# Patient Record
Sex: MALE | Race: WHITE | NOT HISPANIC OR LATINO | URBAN - NONMETROPOLITAN AREA
[De-identification: names, ages, dates, MRNs, and addresses within clinical notes are randomized per-mention and may not be internally consistent; named-entity substitution may affect disease eponyms.]

---

## 2017-12-05 PROBLEM — H52.03: Noted: 2017-12-05

## 2017-12-05 PROBLEM — H25.813: Noted: 2017-12-05

## 2017-12-05 PROBLEM — H52.4: Noted: 2017-12-05

## 2017-12-05 PROBLEM — H52.223: Noted: 2017-12-05

## 2019-01-10 ENCOUNTER — IMPORTED ENCOUNTER (OUTPATIENT)
Dept: URBAN - NONMETROPOLITAN AREA CLINIC 1 | Facility: CLINIC | Age: 59
End: 2019-01-10

## 2019-01-10 PROCEDURE — 92014 COMPRE OPH EXAM EST PT 1/>: CPT

## 2019-01-10 PROCEDURE — 92015 DETERMINE REFRACTIVE STATE: CPT

## 2019-01-10 NOTE — PATIENT DISCUSSION
"Hyperopia-Discussed diagnosis with patient. Astigmatism-Discussed diagnosis with patient. Presbyopia-Discussed diagnosis with patient. Updated spec Rx given. ""Cataract OU-Not yet surgical. -Reviewed symptoms of advancing cataract growth such as glare and halos and decreased vision.-Continue to monitor for now. Pt will notify us if any new symptoms develop. "

## 2020-01-17 ENCOUNTER — IMPORTED ENCOUNTER (OUTPATIENT)
Dept: URBAN - NONMETROPOLITAN AREA CLINIC 1 | Facility: CLINIC | Age: 60
End: 2020-01-17

## 2020-01-17 PROCEDURE — 92015 DETERMINE REFRACTIVE STATE: CPT

## 2020-01-17 PROCEDURE — 92014 COMPRE OPH EXAM EST PT 1/>: CPT

## 2021-01-22 ENCOUNTER — PREPPED CHART (OUTPATIENT)
Dept: RURAL CLINIC 1 | Facility: CLINIC | Age: 61
End: 2021-01-22

## 2021-01-22 ENCOUNTER — IMPORTED ENCOUNTER (OUTPATIENT)
Dept: URBAN - NONMETROPOLITAN AREA CLINIC 1 | Facility: CLINIC | Age: 61
End: 2021-01-22

## 2021-01-22 PROCEDURE — 92015 DETERMINE REFRACTIVE STATE: CPT

## 2021-01-22 PROCEDURE — 92014 COMPRE OPH EXAM EST PT 1/>: CPT

## 2021-01-22 NOTE — PATIENT DISCUSSION
"Hyperopia-Discussed diagnosis with patient. Astigmatism-Discussed diagnosis with patient. Presbyopia-Discussed diagnosis with patient. ""Updated spec Rx given. Recommend lens that will provide comfort as well as protect safety and health of eyes. Cataract OU-Not yet surgical. -Reviewed symptoms of advancing cataract growth such as glare and halos and decreased vision.-Continue to monitor for now. Pt will notify us if any new symptoms develop. "

## 2022-01-18 ASSESSMENT — TONOMETRY
OD_IOP_MMHG: 15
OS_IOP_MMHG: 15

## 2022-01-18 ASSESSMENT — VISUAL ACUITY
OD_CC: 20/20
OS_CC: 20/25

## 2022-01-24 ENCOUNTER — COMPREHENSIVE EXAM (OUTPATIENT)
Dept: RURAL CLINIC 1 | Facility: CLINIC | Age: 62
End: 2022-01-24

## 2022-01-24 DIAGNOSIS — H52.03: ICD-10-CM

## 2022-01-24 DIAGNOSIS — H52.223: ICD-10-CM

## 2022-01-24 DIAGNOSIS — H52.4: ICD-10-CM

## 2022-01-24 DIAGNOSIS — H25.813: ICD-10-CM

## 2022-01-24 PROCEDURE — 92014 COMPRE OPH EXAM EST PT 1/>: CPT

## 2022-01-24 PROCEDURE — 92015 DETERMINE REFRACTIVE STATE: CPT

## 2022-01-24 ASSESSMENT — VISUAL ACUITY
OS_SC: 20/25
OD_SC: 20/40
OU_SC: 20/25

## 2022-04-09 ASSESSMENT — TONOMETRY
OD_IOP_MMHG: 15
OD_IOP_MMHG: 15
OS_IOP_MMHG: 15
OS_IOP_MMHG: 15
OD_IOP_MMHG: 15
OS_IOP_MMHG: 15

## 2022-04-09 ASSESSMENT — VISUAL ACUITY
OD_GLARE: 20/40
OU_CC: J1+
OS_SC: 20/25
OD_SC: 20/20
OU_CC: J1+
OS_SC: 20/50-1
OD_SC: 20/20
OD_SC: 20/30
OU_CC: J2
OS_SC: 20/20
OS_GLARE: 20/40

## 2023-04-12 ENCOUNTER — COMPREHENSIVE EXAM (OUTPATIENT)
Dept: RURAL CLINIC 1 | Facility: CLINIC | Age: 63
End: 2023-04-12

## 2023-04-12 DIAGNOSIS — H52.03: ICD-10-CM

## 2023-04-12 DIAGNOSIS — H52.223: ICD-10-CM

## 2023-04-12 DIAGNOSIS — H25.813: ICD-10-CM

## 2023-04-12 DIAGNOSIS — H52.4: ICD-10-CM

## 2023-04-12 PROCEDURE — 92014 COMPRE OPH EXAM EST PT 1/>: CPT

## 2023-04-12 PROCEDURE — 92015 DETERMINE REFRACTIVE STATE: CPT

## 2023-04-12 ASSESSMENT — VISUAL ACUITY
OS_CC: 20/20-1
OS_CC: 20/20
OD_CC: 20/20
OD_CC: 20/20
OU_CC: 20/20
OU_CC: 20/20

## 2023-04-12 ASSESSMENT — TONOMETRY
OD_IOP_MMHG: 15
OS_IOP_MMHG: 17

## 2024-06-18 ENCOUNTER — ESTABLISHED PATIENT (OUTPATIENT)
Dept: RURAL CLINIC 1 | Facility: CLINIC | Age: 64
End: 2024-06-18

## 2024-06-18 DIAGNOSIS — H52.223: ICD-10-CM

## 2024-06-18 DIAGNOSIS — H52.03: ICD-10-CM

## 2024-06-18 DIAGNOSIS — H52.4: ICD-10-CM

## 2024-06-18 DIAGNOSIS — H25.813: ICD-10-CM

## 2024-06-18 PROCEDURE — 92015 DETERMINE REFRACTIVE STATE: CPT

## 2024-06-18 PROCEDURE — 92014 COMPRE OPH EXAM EST PT 1/>: CPT

## 2024-06-18 ASSESSMENT — VISUAL ACUITY
OS_CC: 20/30
OD_CC: 20/30

## 2024-06-18 ASSESSMENT — TONOMETRY
OS_IOP_MMHG: 16
OD_IOP_MMHG: 16

## 2025-08-08 ENCOUNTER — COMPREHENSIVE EXAM (OUTPATIENT)
Age: 65
End: 2025-08-08

## 2025-08-08 DIAGNOSIS — H52.03: ICD-10-CM

## 2025-08-08 DIAGNOSIS — H52.4: ICD-10-CM

## 2025-08-08 DIAGNOSIS — H52.223: ICD-10-CM

## 2025-08-08 DIAGNOSIS — H25.813: ICD-10-CM

## 2025-08-08 PROCEDURE — 92014 COMPRE OPH EXAM EST PT 1/>: CPT

## 2025-08-08 PROCEDURE — 92015 DETERMINE REFRACTIVE STATE: CPT
